# Patient Record
Sex: FEMALE | Race: WHITE | NOT HISPANIC OR LATINO | Employment: PART TIME | URBAN - METROPOLITAN AREA
[De-identification: names, ages, dates, MRNs, and addresses within clinical notes are randomized per-mention and may not be internally consistent; named-entity substitution may affect disease eponyms.]

---

## 2024-02-05 ENCOUNTER — OFFICE VISIT (OUTPATIENT)
Dept: ORTHOPEDIC SURGERY | Facility: CLINIC | Age: 44
End: 2024-02-05
Payer: COMMERCIAL

## 2024-02-05 ENCOUNTER — HOSPITAL ENCOUNTER (OUTPATIENT)
Dept: RADIOLOGY | Facility: CLINIC | Age: 44
Discharge: HOME | End: 2024-02-05
Payer: COMMERCIAL

## 2024-02-05 VITALS — WEIGHT: 128 LBS | HEIGHT: 63 IN | BODY MASS INDEX: 22.68 KG/M2

## 2024-02-05 DIAGNOSIS — M70.41 PREPATELLAR BURSITIS, RIGHT KNEE: Primary | ICD-10-CM

## 2024-02-05 DIAGNOSIS — M25.561 RIGHT KNEE PAIN, UNSPECIFIED CHRONICITY: ICD-10-CM

## 2024-02-05 PROCEDURE — 73564 X-RAY EXAM KNEE 4 OR MORE: CPT | Mod: RT

## 2024-02-05 PROCEDURE — 99204 OFFICE O/P NEW MOD 45 MIN: CPT | Performed by: ORTHOPAEDIC SURGERY

## 2024-02-05 PROCEDURE — 73564 X-RAY EXAM KNEE 4 OR MORE: CPT | Mod: RIGHT SIDE | Performed by: RADIOLOGY

## 2024-02-05 ASSESSMENT — PAIN - FUNCTIONAL ASSESSMENT: PAIN_FUNCTIONAL_ASSESSMENT: 0-10

## 2024-02-05 ASSESSMENT — PAIN SCALES - GENERAL: PAINLEVEL_OUTOF10: 4

## 2024-02-05 NOTE — PROGRESS NOTES
43-year-old female states that 2 weeks ago she was running when she fell directly onto the right knee when she was running.  She got a swelling over the patella.  Its gotten mildly better.  The swelling has not gone away.  She states is worse with kneeling and range of motion the knee but she really has no pain    Patients' self reported past medical history, medications, allergies, surgical history, family and social history as well as a 10 point review of systems has been documented in the new patient intake form and scanned into the patient's electronic medical record.  The intake form was reviewed by Dr Horne during the office visit and signed by Dr. Horne and the patient.  Pertinent findings are documented in the HPI.    General Multi-System Physical Exam:  Constitutional  General appearance:  Alert, oriented, and in no acute distress.  Well developed, well nourished.  Head and Face  Head and face:  Normocephalic and atraumatic.  Ears, Nose, Mouth, and Throat  External inspection of ears and nose: Normal.  Eyes:  Pupils are equal and round.  Neck  Neck:  no neck mass was observed.  Pulmonary  Respiratory effort:  no respiratory distress.  Cardiovascular  Intact distal pulses.  Lymphatic  Palpation of lymph nodes in the affected extremity:  Normal.  Skin  Skin and subcutaneous tissue:  Normal skin color and pigmentation.  Normal skin turgor.  No rashes.  Neurologic  Sensation:  normal to light touch.  Psychiatric  Judgement and insight:  Intact.  Mood and affect:  Normal.  Musculoskeletal  Patient has a moderate swelling in the right knee prepatellar bursa.  She has no tenderness no erythema no drainage.  Full range of motion of the right knee with no medial or lateral joint line tenderness.  No effusion in the knee.  Patient has a negative Lockman exam, negative anterior and negative posterior drawer. The knee is stable to varus and valgus stress without pain. Patient is neurovascularly intact in the  bilateral lower extremities.      X-rays of the patient were ordered by Dr Horne and obtained today.  Dr Horne personally reviewed the results of the x-rays.    In addition, Dr Horne independently interpreted the patient's x-rays (performed by the Radiology department) by viewing the x-ray images and this is Dr. Horne's personal interpretation:     Normal x-rays right knee    We had a long discussion regards to her right knee.  We talked about the prepatellar bursitis.  I advised her to avoid compression to this because it can cause it to stay swollen.  We talked about aspiration but I recommended against it due to the fact that can cause it to get infected.  I gave her the instructions on if it gets infected she would need antibiotics if it gets more tender.  It is okay for her to be full active and running but avoid any compression to the prepatellar bursa.  If it does not go away in 3 to 6 months she she can come back to see me we will talk about elective right knee prepatellar bursectomy.        This patient has a new, acute, previously-undiagnosed problem of their affected extremity.  We will begin treatment as listed here and monitor treatment based upon their progression and response to treatment.  Due to the fact that we are just beginning treatment on this issue, this is currently considered an undiagnosed new problem with uncertain prognosis.  The exact diagnosis and their prognosis will depend upon their response to treatment and progression of their condition as time progresses.    Due to this patient's condition, they are at a moderate risk of morbidity from additional diagnostic testing / treatment.